# Patient Record
Sex: MALE | Race: OTHER | URBAN - METROPOLITAN AREA
[De-identification: names, ages, dates, MRNs, and addresses within clinical notes are randomized per-mention and may not be internally consistent; named-entity substitution may affect disease eponyms.]

---

## 2024-04-21 ENCOUNTER — EMERGENCY (EMERGENCY)
Facility: HOSPITAL | Age: 39
LOS: 0 days | Discharge: HOME | End: 2024-04-22
Attending: EMERGENCY MEDICINE
Payer: COMMERCIAL

## 2024-04-21 VITALS
TEMPERATURE: 98 F | WEIGHT: 190.04 LBS | OXYGEN SATURATION: 100 % | SYSTOLIC BLOOD PRESSURE: 134 MMHG | DIASTOLIC BLOOD PRESSURE: 80 MMHG | HEART RATE: 100 BPM | RESPIRATION RATE: 20 BRPM | HEIGHT: 69 IN

## 2024-04-21 DIAGNOSIS — Z88.1 ALLERGY STATUS TO OTHER ANTIBIOTIC AGENTS STATUS: ICD-10-CM
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
## 2024-04-21 DIAGNOSIS — S92.511A DISPLACED FRACTURE OF PROXIMAL PHALANX OF RIGHT LESSER TOE(S), INITIAL ENCOUNTER FOR CLOSED FRACTURE: ICD-10-CM

## 2024-04-21 DIAGNOSIS — M79.671 PAIN IN RIGHT FOOT: ICD-10-CM
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
## 2024-04-21 PROCEDURE — 99284 EMERGENCY DEPT VISIT MOD MDM: CPT | Mod: 25

## 2024-04-21 PROCEDURE — 73610 X-RAY EXAM OF ANKLE: CPT | Mod: RT

## 2024-04-21 PROCEDURE — 73630 X-RAY EXAM OF FOOT: CPT | Mod: RT

## 2024-04-21 PROCEDURE — 28510 TREATMENT OF TOE FRACTURE: CPT | Mod: 54,T9

## 2024-04-21 PROCEDURE — 99283 EMERGENCY DEPT VISIT LOW MDM: CPT

## 2024-04-22 PROCEDURE — 73630 X-RAY EXAM OF FOOT: CPT | Mod: 26,RT

## 2024-04-22 PROCEDURE — 73610 X-RAY EXAM OF ANKLE: CPT | Mod: 26,RT

## 2024-04-22 RX ORDER — IBUPROFEN 200 MG
600 TABLET ORAL ONCE
Refills: 0 | Status: COMPLETED | OUTPATIENT
Start: 2024-04-22 | End: 2024-04-22

## 2024-04-22 RX ADMIN — Medication 600 MILLIGRAM(S): at 01:00

## 2024-04-22 NOTE — ED PROVIDER NOTE - ATTENDING APP SHARED VISIT CONTRIBUTION OF CARE
39-year-old male, no past medical history, fell down basement steps and hurt right foot.  No head trauma.  Exam shows alert patient in no distress, HEENT NCAT PERRL, neck nontender, lungs clear, RR S1S2, abdomen soft NT +BS, + tenderness proximal right fifth toe, neuro A&OX3 GCS 15 no deficits.

## 2024-04-22 NOTE — ED PROVIDER NOTE - NSFOLLOWUPINSTRUCTIONS_ED_ALL_ED_FT
Toe fracture    Please wear hard sole shoe with julissa tape (4th and 5th) for the next 4-6 weeks    A fracture is a break in one of your bones. This can occur from a variety of injuries especially traumatic ones. Symptoms include pain, inability to walk, bruising, or swelling. Do not place any weight on the affected leg.  A splint might have been applied by your health care provider. Make sure to keep it dry and follow up with an orthopedist as instructed.    SEEK IMMEDIATE MEDICAL CARE IF YOU HAVE THE FOLLOWING SYMPTOMS: numbness, tingling, pain, or weakness in any part of your body distal to the fracture.

## 2024-04-22 NOTE — ED PROVIDER NOTE - PATIENT PORTAL LINK FT
You can access the FollowMyHealth Patient Portal offered by [facility] by registering at the following website: [email or web address]. By joining [employer]’s FollowMyHealth portal, you will also be able to view your health information using other applications (apps) compatible with our system.

## 2024-04-22 NOTE — ED PROVIDER NOTE - CLINICAL SUMMARY MEDICAL DECISION MAKING FREE TEXT BOX
39-year-old male, no past medical history who fell down basement steps and hurt right foot.  No other injuries.  X-ray shows fracture right proximal fifth phalanx.  Given julissa tape and hard shoe.  Will refer to podiatry.

## 2024-04-22 NOTE — ED ADULT NURSE NOTE - NSFALLRISKINTERV_ED_ALL_ED

## 2024-04-22 NOTE — ED PROVIDER NOTE - PHYSICAL EXAMINATION
CONSTITUTIONAL: Well-appearing; well-nourished; in no apparent distress.   MS: + swelling/tenderness to proximal right 5th toe with painful ROM. the remainder of right foot/ankle non-tender. right foot nv intact. limited weight bearing 2/2 pain.  SKIN: No skin changes to right foot  NEURO/PSYCH: A & O x 4; grossly unremarkable.

## 2024-04-22 NOTE — ED PROVIDER NOTE - OBJECTIVE STATEMENT
39 years old male no significant history presents complaint of right foot pain status post fall 30 minutes prior to ED arrival.  Reports he was walking down a carpeted stairs at home.  He slipped and fell backward and slid down the staircase.  Pain mostly to his right foot close to his pinky toe.  Otherwise denies head injury and LOC.  Further denies neck and back pain, chest pain, abdominal pain, extremities numbness and weakness.

## 2024-04-22 NOTE — ED PROVIDER NOTE - CARE PROVIDER_API CALL
Jori Hartman  Podiatric Medicine and Surgery  [address], [address]  [place] [address]  Phone: [phone]  Fax: [phone]  Follow Up Time:

## 2024-04-23 PROBLEM — Z00.00 ENCOUNTER FOR PREVENTIVE HEALTH EXAMINATION: Status: ACTIVE | Noted: 2024-04-23

## 2024-04-24 ENCOUNTER — APPOINTMENT (OUTPATIENT)
Dept: PODIATRY | Facility: CLINIC | Age: 39
End: 2024-04-24